# Patient Record
Sex: MALE
[De-identification: names, ages, dates, MRNs, and addresses within clinical notes are randomized per-mention and may not be internally consistent; named-entity substitution may affect disease eponyms.]

---

## 2019-03-26 ENCOUNTER — HOSPITAL ENCOUNTER (EMERGENCY)
Dept: HOSPITAL 14 - H.ER | Age: 24
Discharge: HOME | End: 2019-03-26
Payer: COMMERCIAL

## 2019-03-26 VITALS — TEMPERATURE: 98.5 F | RESPIRATION RATE: 16 BRPM | OXYGEN SATURATION: 98 %

## 2019-03-26 VITALS — HEART RATE: 88 BPM | SYSTOLIC BLOOD PRESSURE: 129 MMHG | DIASTOLIC BLOOD PRESSURE: 70 MMHG

## 2019-03-26 DIAGNOSIS — K52.9: Primary | ICD-10-CM

## 2019-03-26 DIAGNOSIS — R55: ICD-10-CM

## 2019-03-26 DIAGNOSIS — R10.9: ICD-10-CM

## 2019-03-26 LAB
ALBUMIN SERPL-MCNC: 4.8 G/DL (ref 3.5–5)
ALBUMIN/GLOB SERPL: 1.6 {RATIO} (ref 1–2.1)
ALT SERPL-CCNC: 41 U/L (ref 21–72)
AST SERPL-CCNC: 31 U/L (ref 17–59)
BASOPHILS # BLD AUTO: 0 K/UL (ref 0–0.2)
BASOPHILS NFR BLD: 0.1 % (ref 0–2)
BILIRUB UR-MCNC: NEGATIVE MG/DL
BUN SERPL-MCNC: 12 MG/DL (ref 9–20)
CALCIUM SERPL-MCNC: 9.9 MG/DL (ref 8.4–10.2)
COLOR UR: YELLOW
EOSINOPHIL # BLD AUTO: 0.1 K/UL (ref 0–0.7)
EOSINOPHIL NFR BLD: 0.5 % (ref 0–4)
ERYTHROCYTE [DISTWIDTH] IN BLOOD BY AUTOMATED COUNT: 12.4 % (ref 11.5–14.5)
GFR NON-AFRICAN AMERICAN: > 60
GLUCOSE UR STRIP-MCNC: (no result) MG/DL
HGB BLD-MCNC: 15 G/DL (ref 12–18)
LEUKOCYTE ESTERASE UR-ACNC: (no result) LEU/UL
LYMPHOCYTES # BLD AUTO: 1.6 K/UL (ref 1–4.3)
LYMPHOCYTES NFR BLD AUTO: 14.5 % (ref 20–40)
MCH RBC QN AUTO: 30.6 PG (ref 27–31)
MCHC RBC AUTO-ENTMCNC: 35.4 G/DL (ref 33–37)
MCV RBC AUTO: 86.4 FL (ref 80–94)
MONOCYTES # BLD: 0.8 K/UL (ref 0–0.8)
MONOCYTES NFR BLD: 6.8 % (ref 0–10)
NEUTROPHILS # BLD: 8.8 K/UL (ref 1.8–7)
NEUTROPHILS NFR BLD AUTO: 78.1 % (ref 50–75)
NRBC BLD AUTO-RTO: 0 % (ref 0–0)
PH UR STRIP: 5 [PH] (ref 5–8)
PLATELET # BLD: 333 K/UL (ref 130–400)
PMV BLD AUTO: 7.2 FL (ref 7.2–11.7)
PROT UR STRIP-MCNC: NEGATIVE MG/DL
RBC # BLD AUTO: 4.9 MIL/UL (ref 4.4–5.9)
RBC # UR STRIP: NEGATIVE /UL
SP GR UR STRIP: 1.03 (ref 1–1.03)
URINE CLARITY: (no result)
UROBILINOGEN UR-MCNC: (no result) MG/DL (ref 0.2–1)
WBC # BLD AUTO: 11.3 K/UL (ref 4.8–10.8)

## 2019-03-26 PROCEDURE — 87086 URINE CULTURE/COLONY COUNT: CPT

## 2019-03-26 PROCEDURE — 74177 CT ABD & PELVIS W/CONTRAST: CPT

## 2019-03-26 PROCEDURE — 96361 HYDRATE IV INFUSION ADD-ON: CPT

## 2019-03-26 PROCEDURE — 85025 COMPLETE CBC W/AUTO DIFF WBC: CPT

## 2019-03-26 PROCEDURE — 80053 COMPREHEN METABOLIC PANEL: CPT

## 2019-03-26 PROCEDURE — 99284 EMERGENCY DEPT VISIT MOD MDM: CPT

## 2019-03-26 PROCEDURE — 96376 TX/PRO/DX INJ SAME DRUG ADON: CPT

## 2019-03-26 PROCEDURE — 96374 THER/PROPH/DIAG INJ IV PUSH: CPT

## 2019-03-26 PROCEDURE — 93005 ELECTROCARDIOGRAM TRACING: CPT

## 2019-03-26 PROCEDURE — 81003 URINALYSIS AUTO W/O SCOPE: CPT

## 2019-03-26 NOTE — ED PDOC
HPI: Abdomen


Time Seen by Provider: 19 14:15


Chief Complaint (Nursing): Abdominal Pain


Chief Complaint (Provider): LLQ pain


History Per: Patient


History/Exam Limitations: no limitations


Onset/Duration Of Symptoms: Hrs


Outside of [place] travel?: No


Current Symptoms Are (Timing): Still Present


Location Of Pain/Discomfort: LLQ


Associated Symptoms: Nausea, Diarrhea


Alleviating Factors: None


Last Bowel Movement: Today


Additional Complaint(s): 





23 yo healthy M presents with LLQ pain starting at 5am that has been constant 

and progressively getting worse. Pt reports one episode of loose non bloody 

stool this morning. He went to city [facility] who sent him here for further evaluation 

since the pain for was bad. He reports in the car with bumps the pain was worse 

and caused nausea. He denies having pain like this before. Denies fever, chills,

urinary symptoms, testicular pain, abdominal surgery, recent travel, recent 

antibiotic use, known sick contacts, abnormal food intake. 





PMD: none





Past Medical History


Reviewed: Historical Data, Nursing Documentation, Vital Signs


Vital Signs: 





                                Last Vital Signs











Temp  98.5 F   19 13:54


 


Pulse  78   19 13:54


 


Resp  16   19 13:54


 


BP  132/67   19 13:54


 


Pulse Ox  98   19 13:54














- Medical History


PMH: No Chronic Diseases





- Surgical History


Surgical History: No Surg Hx





- Family History


Family History: States: No Known Family Hx





- Social History


Current smoker - smoking cessation education provided: No


Alcohol: Occasional


Drugs: Denies





- Home Medications


Home Medications: 


                                Ambulatory Orders











 Medication  Instructions  Recorded


 


Acetaminophen [8 Hour Pain Relief] 650 mg PO Q6 PRN #20 tablet.er 19


 


Dicyclomine [Bentyl] 20 mg PO TID PRN #12 tab 19


 


Ondansetron ODT [Zofran ODT] 4 mg PO TID PRN #12 odt 19














- Allergies


Allergies/Adverse Reactions: 


                                    Allergies











Allergy/AdvReac Type Severity Reaction Status Date / Time


 


No Known Allergies Allergy   Verified 19 13:56














Review of Systems


Constitutional: Negative for: Fever


Cardiovascular: Negative for: Chest Pain


Respiratory: Negative for: Shortness of Breath


Gastrointestinal: Positive for: Nausea


Genitourinary Male: Negative for: Dysuria, Frequency, Hematuria





Physical Exam





- Physical Exam


Comments: 





GENERALIZED APPEARANCE: Patient is awake, alert, oriented x3 in mild obvious 

discomfort


SKIN: Warm, dry; (-) cyanosis.


EYES: (-) conjunctival pallor, (-) scleral icterus.


ENMT: Mucous membranes moist.


NECK: (-) tenderness, (-) stiffness, (-) lymphadenopathy.


CHEST AND RESPIRATORY: (-) rales, (-) rhonchi, (-) wheezes; breath sounds equal 

bilaterally.


HEART AND CARDIOVASCULAR: (-) irregularity; (-) murmur, (-) gallop.


ABDOMEN AND GI: (-) distention. Bowel sounds active;(+) diffuse lower abdominal 

tenderness, moderate LLQ tenderness with guarding (-) rebound, (-) palpable 

masses, (-) CVA tenderness.


EXTREMITIES: (-) deformity, (-) edema, (+) distal pulses.


NEURO AND PSYCH: Mental status as above; (-) focal findings.





- Laboratory Results


Result Diagrams: 


                                 19 14:30





                                 19 14:30


Lab Results: 





                                        











Total Bilirubin  2.5 mg/dl (0.2-1.3)  H  19  14:30    


 


AST  31 U/L (17-59)   19  14:30    


 


ALT  41 U/L (21-72)   19  14:30    


 


Alkaline Phosphatase  61 U/L ()   19  14:30    


 


Total Protein  7.8 G/DL (6.3-8.2)   19  14:30    


 


Albumin  4.8 g/dL (3.5-5.0)   19  14:30    


 


Globulin  3.1 gm/dL (2.2-3.9)   19  14:30    


 


Albumin/Globulin Ratio  1.6  (1.0-2.1)   19  14:30    








                                        











Urine Color  Yellow  (YELLOW)   19  14:20    


 


Urine Clarity  Slighty-cloudy  (Clear)   19  14:20    


 


Urine pH  5.0  (5.0-8.0)   19  14:20    


 


Ur Specific Gravity  1.026  (1.003-1.030)   19  14:20    


 


Urine Protein  Negative mg/dL (NEGATIVE)   19  14:20    


 


Urine Glucose (UA)  Neg mg/dL (NEGATIVE)   19  14:20    


 


Urine Ketones  20 mg/dL (NEGATIVE)   19  14:20    


 


Urine Blood  Negative  (NEGATIVE)   19  14:20    


 


Urine Nitrate  Negative  (NEGATIVE)   19  14:20    


 


Urine Bilirubin  Negative  (NEGATIVE)   19  14:20    


 


Urine Urobilinogen  0.2-1.0 mg/dL (0.2-1.0)   19  14:20    


 


Ur Leukocyte Esterase  Neg Armani/uL (Negative)   19  14:20    


 


Urine RBC (Auto)  2 /hpf (0-3)   19  14:20    


 


Urine Microscopic WBC  1 /hpf (0-5)   19  14:20    














- ECG


O2 Sat by Pulse Oximetry: 98





Medical Decision Making


Medical Decision Makin:15


initial eval, 23 yo healthy M with LLQ pain and tenderness


* labs


* Toradol IV


* IVF


* CT abd/pelvis r/o diverticulitis vs kidney stone


* re eval





14:30


pt had syncopal episode when IV was place by RN, he has fear of needles, RN r

candace pt syncopized for 5 secs


on my reeval pt is pale, diaphoretic, alert and oriented x3, EKG done showing 

NSR 70, HR and BP are stable, pt states feeling lightheaded, but getting better,

given po water, IVF pushed, pt refusing accu check due to fear of needles, 

likely vasovagal syncope





15:30


re eval pt is feeling much better, VSS, pain is improved, currently no pain, 

reports sometimes pain comes sharp and quick, pending CT results








CT report reviewed, acute colitis, no abscess or perforation


re eval pt states he was feeling better, but now pain is starting to come back, 

mild-moderate LLQ tenderness, no guarding or rebound, will treat Toradol IV








re eval pt feeling better, ready to go home


pt with mildly elevated WBC 11.3, one episode of loose stool, none while here, 

no abscess or perforation on CT, pain improved, tolerating po with no 

difficulties, VSS, afebrile, antibiotics not indicated, pt stable for dc home


Discussed results, diagnosis, treatment, return precautions and f/u with pt who 

is understanding, in agreement and stable for dc





Disposition





- Clinical Impression


Clinical Impression: 


 Colitis, Abdominal pain, Vasovagal syncope








- Patient ED Disposition


Is Patient to be Admitted: No


Counseled Patient/Family Regarding: Studies Performed, Diagnosis, Need For 

Followup, Rx Given





- Disposition


Referrals: 


[facility] [Outside]


Disposition: Routine/Home


Disposition Time: 18:04


Condition: IMPROVED


Additional Instructions: 


Return to ED for new or worsening symptoms, fever >100.4, increasing severe 

pain, unable to tolerate liquids. Follow up with your primary care doctor or 

clinic as listed in 2-3 days. Take medications as prescribed for symptoms. Rest,

 drink plenty of fluids to stay hydrated - water, gatorade. Eat a bland diet - 

BRAT (bananas, rice, applesauce, toast)


Prescriptions: 


Acetaminophen [8 Hour Pain Relief] 650 mg PO Q6 PRN #20 tablet.er


 PRN Reason: Pain, Moderate (4-7)


Dicyclomine [Bentyl] 20 mg PO TID PRN #12 tab


 PRN Reason: abdominal pain/cramping


Ondansetron ODT [Zofran ODT] 4 mg PO TID PRN #12 odt


 PRN Reason: Nausea/Vomiting


Instructions:  Diarrhea in Adolescents and Adults, Acute Abdomen (Belly Pain), 

Adult (DC), Vasovagal Response (DC), Colitis (DC)


Forms:  [employer] (English)


Print Language: ENGLISH





- POA


Present On Arrival: None





Results





- Diagnostic Imaging Results





                                Radiology Results





Abdomen/Pelvis CT  19 14:18


IMPRESSION:


Segmental inflammatory changes confined to distal descending colon 


sigmoid colon likely acute colitis.  No free air, drainable 


collection identified.


 


 














- Lab Results


Lab Results: 

















  19





  14:30 14:30 14:20


 


WBC   11.3 H 


 


RBC   4.90 


 


Hgb   15.0 


 


Hct   42.4 


 


MCV   86.4 


 


MCH   30.6 


 


MCHC   35.4 


 


RDW   12.4 


 


Plt Count   333 


 


MPV   7.2 


 


Neut % (Auto)   78.1 H 


 


Lymph % (Auto)   14.5 L 


 


Mono % (Auto)   6.8 


 


Eos % (Auto)   0.5 


 


Baso % (Auto)   0.1 


 


Neut # (Auto)   8.8 H 


 


Lymph # (Auto)   1.6 


 


Mono # (Auto)   0.8 


 


Eos # (Auto)   0.1 


 


Baso # (Auto)   0.0 


 


Sodium  138  


 


Potassium  4.2  


 


Chloride  99  


 


Carbon Dioxide  28  


 


Anion Gap  15  


 


BUN  12  


 


Creatinine  0.8  


 


Est GFR ( Amer)  > 60  


 


Est GFR (Non-Af Amer)  > 60  


 


Random Glucose  102  


 


Calcium  9.9  


 


Total Bilirubin  2.5 H  


 


AST  31  


 


ALT  41  


 


Alkaline Phosphatase  61  


 


Total Protein  7.8  


 


Albumin  4.8  


 


Globulin  3.1  


 


Albumin/Globulin Ratio  1.6  


 


Urine Color    Yellow


 


Urine Clarity    Slighty-cloudy


 


Urine pH    5.0


 


Ur Specific Gravity    1.026


 


Urine Protein    Negative


 


Urine Glucose (UA)    Neg


 


Urine Ketones    20


 


Urine Blood    Negative


 


Urine Nitrate    Negative


 


Urine Bilirubin    Negative


 


Urine Urobilinogen    0.2-1.0


 


Ur Leukocyte Esterase    Neg


 


Urine RBC (Auto)    2


 


Urine Microscopic WBC    1

## 2019-03-26 NOTE — CT
Date of service: 



03/26/2019



PROCEDURE:  CT Abdomen and Pelvis with contrast



HISTORY:

LLQ pain with rebound



COMPARISON:

None.



TECHNIQUE:

Intravenous contrast dose: 95 cc Omnipaque 300.



Radiation dose:



Total exam DLP = 577.24 mGy-cm.



This CT exam was performed using one or more of the following dose 

reduction techniques: Automated exposure control, adjustment of the 

mA and/or kV according to patient size, and/or use of iterative 

reconstruction technique.



FINDINGS:



LOWER THORAX:

Unremarkable. 



LIVER:

Unremarkable. No gross lesion or ductal dilatation. 



GALLBLADDER AND BILE DUCTS:

Unremarkable. 



PANCREAS:

Unremarkable. No gross lesion or ductal dilatation.



SPLEEN:

Unremarkable. 



ADRENALS:

Unremarkable. No mass. 



KIDNEYS AND URETERS:

Unremarkable. No hydronephrosis. No solid mass. 



VASCULATURE:

Unremarkable. No aortic aneurysm. No atherosclerotic calcification or 

mural plaque present.



BOWEL:

Inflammatory changes affecting mid, distal descending colon and 

adjacent sigmoid.  Findings are likely segmental colitis.  No 

significant diverticular disease identified in the affected area.  



APPENDIX:

A normal appendix is visualized in it's entirety. 



PERITONEUM:

Trace fluid identified in the pelvis.  No free air. 



LYMPH NODES:

Unremarkable. No enlarged lymph nodes. 



BLADDER:

Unremarkable. 



REPRODUCTIVE:

Unremarkable. 



BONES:

No acute fracture. 



OTHER FINDINGS:

None.



IMPRESSION:

Segmental inflammatory changes confined to distal descending colon 

sigmoid colon likely acute colitis.  No free air, drainable 

collection identified.

## 2019-03-26 NOTE — CARD
--------------- APPROVED REPORT --------------





Date of service: 03/26/2019



EKG Measurement

Heart [id number]

CA [id number]

[id number]

[id number]

[id number]



<Conclusion>

Normal sinus rhythm

Normal ECG